# Patient Record
Sex: FEMALE | Race: BLACK OR AFRICAN AMERICAN | NOT HISPANIC OR LATINO | URBAN - METROPOLITAN AREA
[De-identification: names, ages, dates, MRNs, and addresses within clinical notes are randomized per-mention and may not be internally consistent; named-entity substitution may affect disease eponyms.]

---

## 2020-10-19 ENCOUNTER — OFFICE VISIT (OUTPATIENT)
Dept: URBAN - METROPOLITAN AREA CLINIC 78 | Facility: CLINIC | Age: 75
End: 2020-10-19
Payer: MEDICARE

## 2020-10-19 DIAGNOSIS — R19.5 ABNORMAL STOOL TEST: ICD-10-CM

## 2020-10-19 DIAGNOSIS — K43.9 ABDOMINAL WALL HERNIA: ICD-10-CM

## 2020-10-19 DIAGNOSIS — E65 CENTRAL OBESITY: ICD-10-CM

## 2020-10-19 PROBLEM — 443371000124107 BODY MASS INDEX 30.00 TO 34.99: Status: ACTIVE | Noted: 2020-10-19

## 2020-10-19 PROBLEM — 248311001 CENTRAL OBESITY: Status: ACTIVE | Noted: 2020-10-19

## 2020-10-19 PROCEDURE — 99204 OFFICE O/P NEW MOD 45 MIN: CPT | Performed by: INTERNAL MEDICINE

## 2020-10-19 RX ORDER — SODIUM, POTASSIUM,MAG SULFATES 17.5-3.13G
177 ML DAY 1 AND 177 ML DAY 2 SOLUTION, RECONSTITUTED, ORAL ORAL
Qty: 354 MILLILITER | Refills: 0 | OUTPATIENT
Start: 2020-10-19

## 2020-10-19 NOTE — PHYSICAL EXAM GASTROINTESTINAL
Abdomen , soft, non-tender, suprapubic medium sized reducible hernia non-distended , no guarding or rigidity , no masses palpable , normal bowel sounds Central obesity and hernia limits OSM exam

## 2020-10-19 NOTE — HPI-TODAY'S VISIT:
The patient presents for evaluation of colonoscopy  because of Blood in stool . Patient denies any significant GI complaints or any issues with anemia. They have never had a colonoscopy in the past > 10 years  Denies any abdominal pain, nausea/vomiting, weight loss, bloody stools, constipation/diarrhea.  Remote hx of brain surgery , no residual deficit  Daughter  of Meloma at age 16 (  )  Father had prostate cancer  Denies chest pain or SOB  Can climb a flight of stairs without significant problems

## 2021-01-19 ENCOUNTER — OFFICE VISIT (OUTPATIENT)
Dept: URBAN - METROPOLITAN AREA SURGERY CENTER 15 | Facility: SURGERY CENTER | Age: 76
End: 2021-01-19
Payer: MEDICARE

## 2021-01-19 ENCOUNTER — CLAIMS CREATED FROM THE CLAIM WINDOW (OUTPATIENT)
Dept: URBAN - METROPOLITAN AREA CLINIC 4 | Facility: CLINIC | Age: 76
End: 2021-01-19
Payer: MEDICARE

## 2021-01-19 DIAGNOSIS — R19.5 ABNORMAL FECES: ICD-10-CM

## 2021-01-19 DIAGNOSIS — D12.5 BENIGN NEOPLASM OF SIGMOID COLON: ICD-10-CM

## 2021-01-19 DIAGNOSIS — D12.5 ADENOMA OF SIGMOID COLON: ICD-10-CM

## 2021-01-19 DIAGNOSIS — K63.89 OTHER SPECIFIED DISEASES OF INTESTINE: ICD-10-CM

## 2021-01-19 PROCEDURE — 88305 TISSUE EXAM BY PATHOLOGIST: CPT | Performed by: PATHOLOGY

## 2021-01-19 PROCEDURE — G8907 PT DOC NO EVENTS ON DISCHARG: HCPCS | Performed by: INTERNAL MEDICINE

## 2021-01-19 PROCEDURE — G9937 DIG OR SURV COLSCO: HCPCS | Performed by: INTERNAL MEDICINE

## 2021-01-19 PROCEDURE — 45385 COLONOSCOPY W/LESION REMOVAL: CPT | Performed by: INTERNAL MEDICINE

## 2021-01-29 ENCOUNTER — DASHBOARD ENCOUNTERS (OUTPATIENT)
Age: 76
End: 2021-01-29